# Patient Record
Sex: FEMALE | Race: WHITE | NOT HISPANIC OR LATINO | URBAN - METROPOLITAN AREA
[De-identification: names, ages, dates, MRNs, and addresses within clinical notes are randomized per-mention and may not be internally consistent; named-entity substitution may affect disease eponyms.]

---

## 2018-07-08 ENCOUNTER — EMERGENCY (EMERGENCY)
Facility: HOSPITAL | Age: 43
LOS: 1 days | Discharge: LEFT BEFORE DISCHARGE | End: 2018-07-08
Attending: EMERGENCY MEDICINE | Admitting: SURGERY
Payer: COMMERCIAL

## 2018-07-08 ENCOUNTER — INPATIENT (INPATIENT)
Facility: HOSPITAL | Age: 43
LOS: 0 days | Discharge: HOME | End: 2018-07-09
Attending: SPECIALIST | Admitting: SPECIALIST
Payer: COMMERCIAL

## 2018-07-08 ENCOUNTER — TRANSCRIPTION ENCOUNTER (OUTPATIENT)
Age: 43
End: 2018-07-08

## 2018-07-08 VITALS
DIASTOLIC BLOOD PRESSURE: 63 MMHG | SYSTOLIC BLOOD PRESSURE: 104 MMHG | TEMPERATURE: 98 F | OXYGEN SATURATION: 98 % | RESPIRATION RATE: 16 BRPM | HEART RATE: 78 BPM

## 2018-07-08 VITALS
OXYGEN SATURATION: 98 % | SYSTOLIC BLOOD PRESSURE: 114 MMHG | DIASTOLIC BLOOD PRESSURE: 75 MMHG | TEMPERATURE: 98 F | RESPIRATION RATE: 18 BRPM | HEART RATE: 97 BPM

## 2018-07-08 VITALS
TEMPERATURE: 98 F | DIASTOLIC BLOOD PRESSURE: 83 MMHG | HEART RATE: 88 BPM | OXYGEN SATURATION: 100 % | SYSTOLIC BLOOD PRESSURE: 118 MMHG | RESPIRATION RATE: 16 BRPM

## 2018-07-08 VITALS
SYSTOLIC BLOOD PRESSURE: 120 MMHG | TEMPERATURE: 98 F | OXYGEN SATURATION: 100 % | RESPIRATION RATE: 22 BRPM | HEART RATE: 77 BPM | DIASTOLIC BLOOD PRESSURE: 66 MMHG

## 2018-07-08 DIAGNOSIS — Z90.49 ACQUIRED ABSENCE OF OTHER SPECIFIED PARTS OF DIGESTIVE TRACT: Chronic | ICD-10-CM

## 2018-07-08 DIAGNOSIS — Z98.891 HISTORY OF UTERINE SCAR FROM PREVIOUS SURGERY: Chronic | ICD-10-CM

## 2018-07-08 DIAGNOSIS — T18.5XXA FOREIGN BODY IN ANUS AND RECTUM, INITIAL ENCOUNTER: ICD-10-CM

## 2018-07-08 LAB
ALBUMIN SERPL ELPH-MCNC: 4.4 G/DL — SIGNIFICANT CHANGE UP (ref 3.3–5)
ALP SERPL-CCNC: 64 U/L — SIGNIFICANT CHANGE UP (ref 40–120)
ALT FLD-CCNC: 18 U/L — SIGNIFICANT CHANGE UP (ref 4–33)
APTT BLD: 30.2 SEC — SIGNIFICANT CHANGE UP (ref 27.5–37.4)
AST SERPL-CCNC: 19 U/L — SIGNIFICANT CHANGE UP (ref 4–32)
BASOPHILS # BLD AUTO: 0.05 K/UL — SIGNIFICANT CHANGE UP (ref 0–0.2)
BASOPHILS NFR BLD AUTO: 0.5 % — SIGNIFICANT CHANGE UP (ref 0–2)
BILIRUB SERPL-MCNC: 0.2 MG/DL — SIGNIFICANT CHANGE UP (ref 0.2–1.2)
BLD GP AB SCN SERPL QL: NEGATIVE — SIGNIFICANT CHANGE UP
BUN SERPL-MCNC: 16 MG/DL — SIGNIFICANT CHANGE UP (ref 7–23)
CALCIUM SERPL-MCNC: 9.1 MG/DL — SIGNIFICANT CHANGE UP (ref 8.4–10.5)
CHLORIDE SERPL-SCNC: 102 MMOL/L — SIGNIFICANT CHANGE UP (ref 98–107)
CO2 SERPL-SCNC: 23 MMOL/L — SIGNIFICANT CHANGE UP (ref 22–31)
CREAT SERPL-MCNC: 0.9 MG/DL — SIGNIFICANT CHANGE UP (ref 0.5–1.3)
EOSINOPHIL # BLD AUTO: 0.2 K/UL — SIGNIFICANT CHANGE UP (ref 0–0.5)
EOSINOPHIL NFR BLD AUTO: 2 % — SIGNIFICANT CHANGE UP (ref 0–6)
GLUCOSE SERPL-MCNC: 100 MG/DL — HIGH (ref 70–99)
HCG SERPL-ACNC: < 5 MIU/ML — SIGNIFICANT CHANGE UP
HCT VFR BLD CALC: 44.6 % — SIGNIFICANT CHANGE UP (ref 34.5–45)
HGB BLD-MCNC: 14.3 G/DL — SIGNIFICANT CHANGE UP (ref 11.5–15.5)
IMM GRANULOCYTES # BLD AUTO: 0.08 # — SIGNIFICANT CHANGE UP
IMM GRANULOCYTES NFR BLD AUTO: 0.8 % — SIGNIFICANT CHANGE UP (ref 0–1.5)
INR BLD: 1.1 — SIGNIFICANT CHANGE UP (ref 0.88–1.17)
LYMPHOCYTES # BLD AUTO: 2.78 K/UL — SIGNIFICANT CHANGE UP (ref 1–3.3)
LYMPHOCYTES # BLD AUTO: 27.9 % — SIGNIFICANT CHANGE UP (ref 13–44)
MCHC RBC-ENTMCNC: 29.3 PG — SIGNIFICANT CHANGE UP (ref 27–34)
MCHC RBC-ENTMCNC: 32.1 % — SIGNIFICANT CHANGE UP (ref 32–36)
MCV RBC AUTO: 91.4 FL — SIGNIFICANT CHANGE UP (ref 80–100)
MONOCYTES # BLD AUTO: 0.61 K/UL — SIGNIFICANT CHANGE UP (ref 0–0.9)
MONOCYTES NFR BLD AUTO: 6.1 % — SIGNIFICANT CHANGE UP (ref 2–14)
NEUTROPHILS # BLD AUTO: 6.24 K/UL — SIGNIFICANT CHANGE UP (ref 1.8–7.4)
NEUTROPHILS NFR BLD AUTO: 62.7 % — SIGNIFICANT CHANGE UP (ref 43–77)
NRBC # FLD: 0 — SIGNIFICANT CHANGE UP
PLATELET # BLD AUTO: 240 K/UL — SIGNIFICANT CHANGE UP (ref 150–400)
PMV BLD: 10.8 FL — SIGNIFICANT CHANGE UP (ref 7–13)
POTASSIUM SERPL-MCNC: 3.8 MMOL/L — SIGNIFICANT CHANGE UP (ref 3.5–5.3)
POTASSIUM SERPL-SCNC: 3.8 MMOL/L — SIGNIFICANT CHANGE UP (ref 3.5–5.3)
PROT SERPL-MCNC: 7.4 G/DL — SIGNIFICANT CHANGE UP (ref 6–8.3)
PROTHROM AB SERPL-ACNC: 12.2 SEC — SIGNIFICANT CHANGE UP (ref 9.8–13.1)
RBC # BLD: 4.88 M/UL — SIGNIFICANT CHANGE UP (ref 3.8–5.2)
RBC # FLD: 12.8 % — SIGNIFICANT CHANGE UP (ref 10.3–14.5)
RH IG SCN BLD-IMP: NEGATIVE — SIGNIFICANT CHANGE UP
RH IG SCN BLD-IMP: NEGATIVE — SIGNIFICANT CHANGE UP
SODIUM SERPL-SCNC: 137 MMOL/L — SIGNIFICANT CHANGE UP (ref 135–145)
WBC # BLD: 9.96 K/UL — SIGNIFICANT CHANGE UP (ref 3.8–10.5)
WBC # FLD AUTO: 9.96 K/UL — SIGNIFICANT CHANGE UP (ref 3.8–10.5)

## 2018-07-08 PROCEDURE — 99285 EMERGENCY DEPT VISIT HI MDM: CPT | Mod: 25

## 2018-07-08 PROCEDURE — 99222 1ST HOSP IP/OBS MODERATE 55: CPT | Mod: 25,GC

## 2018-07-08 PROCEDURE — 45330 DIAGNOSTIC SIGMOIDOSCOPY: CPT | Mod: GC

## 2018-07-08 PROCEDURE — 74018 RADEX ABDOMEN 1 VIEW: CPT | Mod: 26,77

## 2018-07-08 PROCEDURE — 74177 CT ABD & PELVIS W/CONTRAST: CPT | Mod: 26

## 2018-07-08 PROCEDURE — 74018 RADEX ABDOMEN 1 VIEW: CPT | Mod: 26

## 2018-07-08 RX ORDER — SODIUM CHLORIDE 9 MG/ML
1000 INJECTION, SOLUTION INTRAVENOUS ONCE
Qty: 0 | Refills: 0 | Status: COMPLETED | OUTPATIENT
Start: 2018-07-08 | End: 2018-07-08

## 2018-07-08 RX ORDER — SODIUM CHLORIDE 9 MG/ML
1000 INJECTION, SOLUTION INTRAVENOUS
Qty: 0 | Refills: 0 | Status: DISCONTINUED | OUTPATIENT
Start: 2018-07-08 | End: 2018-07-09

## 2018-07-08 RX ORDER — SODIUM CHLORIDE 9 MG/ML
1000 INJECTION, SOLUTION INTRAVENOUS
Qty: 0 | Refills: 0 | Status: DISCONTINUED | OUTPATIENT
Start: 2018-07-08 | End: 2018-07-08

## 2018-07-08 RX ORDER — HEPARIN SODIUM 5000 [USP'U]/ML
5000 INJECTION INTRAVENOUS; SUBCUTANEOUS EVERY 8 HOURS
Qty: 0 | Refills: 0 | Status: DISCONTINUED | OUTPATIENT
Start: 2018-07-08 | End: 2018-07-08

## 2018-07-08 RX ADMIN — SODIUM CHLORIDE 100 MILLILITER(S): 9 INJECTION, SOLUTION INTRAVENOUS at 16:30

## 2018-07-08 RX ADMIN — Medication 1 MILLIGRAM(S): at 11:40

## 2018-07-08 RX ADMIN — SODIUM CHLORIDE 125 MILLILITER(S): 9 INJECTION, SOLUTION INTRAVENOUS at 06:03

## 2018-07-08 RX ADMIN — SODIUM CHLORIDE 2000 MILLILITER(S): 9 INJECTION, SOLUTION INTRAVENOUS at 06:03

## 2018-07-08 RX ADMIN — Medication 1 MILLIGRAM(S): at 04:08

## 2018-07-08 RX ADMIN — HEPARIN SODIUM 5000 UNIT(S): 5000 INJECTION INTRAVENOUS; SUBCUTANEOUS at 06:03

## 2018-07-08 NOTE — CONSULT NOTE ADULT - SUBJECTIVE AND OBJECTIVE BOX
GI INITIAL CONSULT    HPI: 42F presents after insertion of a foreign body into her rectum. Pt states that last night a rectal plug was inserted into her rectum and it went in too far and was lost in the rectal cavity. Pt and her partner came to the ER for further evaluation. Pt does not report any abd pain, N/V, diarrhea, or bleeding.    PMH: none reported  PSH: ; cholecystectomy    Meds: MEDICATIONS  (STANDING):  heparin  Injectable 5000 Unit(s) SubCutaneous every 8 hours  lactated ringers. 1000 milliLiter(s) (125 mL/Hr) IV Continuous <Continuous>    SH: noncontributory  FH: noncontributory  ROS:  CONSTITUTIONAL: No fever, weight loss, or fatigue  EYES: No eye pain, visual disturbances, or discharge  ENT:  No difficulty hearing, tinnitus, vertigo; No sinus or throat pain  NECK: No pain or stiffness  RESPIRATORY: No cough, wheezing, chills or hemoptysis, shortness of Breath  CARDIOVASCULAR: No chest pain, palpitations, passing out, dizziness, or leg swelling  GASTROINTESTINAL: see HPI  GENITOURINARY: No dysuria, frequency, hematuria, or incontinence  NEUROLOGICAL: No headaches, memory loss, loss of strength, numbness, or tremors  SKIN: No itching, burning, rashes, or lesions   MUSCULOSKELETAL: No arthralgia, myalgia, or back pain.     Vitals: T(C): 36.7 (18 @ 12:00)  T(F): 98 (18 @ 12:00), Max: 98.8 (18 @ 03:41)  HR: 85 (18 @ 12:00) (77 - 97)  BP: 115/73 (18 @ 12:00) (114/75 - 121/67)    Gen: NAD  CVS: RRR; S1/S2  Chest: CTABL  Abd: S/NT/ND                        14.3   9.96  )-----------( 240      ( 2018 04:37 )             44.6       137  |  102  |  16  ----------------------------<  100<H>  3.8   |  23  |  0.90    Ca    9.1      2018 04:37    TPro  7.4  /  Alb  4.4  /  TBili  0.2  /  DBili  x   /  AST  19  /  ALT  18  /  AlkPhos  64  07-08    Imaging reviewed

## 2018-07-08 NOTE — ED ADULT NURSE REASSESSMENT NOTE - NS ED NURSE REASSESS COMMENT FT1
RN Break Coverage : Alert and oriented x 4. Pt received from TREY Olivarez in no distress. Pt awaiting surgery consult. Will continue to monitor.

## 2018-07-08 NOTE — ED ADULT NURSE NOTE - OBJECTIVE STATEMENT
42yof a&ox4 amb presents to ED 12 w/ rectal foreign body. pt reports she was having intercourse with her boyfriend and used an "anal device" which she was unable to remove. pt c/o mild discomfort, took 2 tylenol at 0200 this AM. denies any bleeding. breathing even/unlabored. vss. will continue to monitor.

## 2018-07-08 NOTE — DISCHARGE NOTE ADULT - HOSPITAL COURSE
Ms Snowden presented to the ED due to a rectal foreign body. Before intervention was initiated, she left the ED AMA. When the patient came back to the ED, a flexible sigmoidoscopy was performed by the gastroenterologist. No foreign bodies were discovered at this time, however a sigmoid polyp was noted. Due to the lack of foreign body seen during the sigmoidoscopy, a CT scan of the abdomen and pelvis was performed to confirm this finding. The CT scan did not identify any foreign bodies in the colon or rectum. It is likely that the foreign body passed on its own while the patient was outside of the hospital. She was informed that she is to follow up with gastroenterology so that she can undergo a colonoscopy with polypectomy.

## 2018-07-08 NOTE — DISCHARGE NOTE ADULT - PATIENT PORTAL LINK FT
You can access the [facility], offered by [facility], by registering with the following website: [email or web address]

## 2018-07-08 NOTE — ED PROVIDER NOTE - OBJECTIVE STATEMENT
43 y/o F no PMHx, seen earlier tonight for a retained rectal plug, admitted to surgery, AMA'd due to fear of potential laparotomy and colostomy here for removal of the foreign body. Pt absolutely refuses any surgical intervention. Spoke to a private gastroenterologist, Dr. Sagar Tejada, who states he would come into the hospital later today and remove the object under anesthesia, via colonoscopy. Denies any complaints now. Appears anxious.

## 2018-07-08 NOTE — ED PROVIDER NOTE - PROGRESS NOTE DETAILS
KESHIA Hyde: Had an extensive conversation w/ the pt regarding the nessesity of removal of the foreign body. Pt states she understands the risks of not removing it (including infection, perforation, death) however will not consent to a laparotomy/colostomy. Pt refuses care from Dr. Vallejo from now on. Spoke to GI fellow, states Dr. Tejada booked the OR for colonoscopy under anesthesia today at 4. Spoke to surgery and gave Dr. Knight number to discuss plan. KESHIA Hyde: Spoke to surgery who discussed plan w/ GI, GI to take pt to the OR today to attempt removal of fb. Dr. Mcneil to speak to the pt soon regarding possible surgical intervention if GI is unsuccessful. ART Balderrama: Spoke with surgery team. They plan to have Ewa discuss surgical plan with pt in person. Litvak will be down to see pt shortly. KESHIA Hyde: Pt seen by Dr. Mcneil, will admit to his service. Will be in the OR with GI in case the fb in not removed w/ colonoscopy.

## 2018-07-08 NOTE — H&P ADULT - HISTORY OF PRESENT ILLNESS
43yo F with PMH of cholecystectomy and  presents today with rectal foreign body. Pt reports she had a rectal plug in her bottom that was placed about 2.5 hours ago. It went in too far and her and her partner were unable to retrieve it so came to the ED. Has some mild lower abdominal pain, no nausea, vomiting, fever, chills, diarrhea. Is passing gas but no BM.

## 2018-07-08 NOTE — CONSULT NOTE ADULT - ASSESSMENT
Impression:  1) Rectal foreign body without evidence of perforation or obstruction     Plan:  - OR for flex sig and foreign body removal  - surgical team on standby should foreign body not be amenable to endoscopic removal  - NPO

## 2018-07-08 NOTE — DISCHARGE NOTE ADULT - CARE PROVIDER_API CALL
Ti Frances), Gastroenterology; Internal Medicine  [address] [place] [id number]  Phone: [phone]  Fax: [phone]

## 2018-07-08 NOTE — H&P ADULT - ASSESSMENT
43 yo F with rectal foreign body    - NPO, IVF  - OR for EUA, rigid sig, possible exlap    discussed with Dr. Yoni VANG Team [id number]

## 2018-07-08 NOTE — H&P ADULT - NSHPLABSRESULTS_GEN_ALL_CORE
14.3   9.96  )-----------( 240      ( 08 Jul 2018 04:37 )             44.6           PT/INR - ( 08 Jul 2018 04:37 )   PT: 12.2 SEC;   INR: 1.10          PTT - ( 08 Jul 2018 04:37 )  PTT:30.2 SEC      IMAGING STUDIES:

## 2018-07-08 NOTE — H&P ADULT - NSHPPHYSICALEXAM_GEN_ALL_CORE
Vital Signs Last 24 Hrs  T(C): 37.1 (08 Jul 2018 03:41), Max: 37.1 (08 Jul 2018 03:41)  T(F): 98.8 (08 Jul 2018 03:41), Max: 98.8 (08 Jul 2018 03:41)  HR: 87 (08 Jul 2018 03:41) (87 - 97)  BP: 118/78 (08 Jul 2018 03:41) (114/75 - 118/78)  BP(mean): --  RR: 16 (08 Jul 2018 03:41) (16 - 18)  SpO2: 100% (08 Jul 2018 03:41) (98% - 100%)  Daily     Daily     Exam:  General: awake, alert, NAD  HEENT: NCAT, MMM  Resp: nonlabored  Chest: equal chest rise  Abd: soft, NT, ND, no rebound or guarding  Ext: ROSALES  Neuro: intact  Rectal: Foreign body palpated at tip of fingers but unable to retrieve. no blood. good rectal tone

## 2018-07-08 NOTE — DISCHARGE NOTE ADULT - CARE PLAN
Principal Discharge DX:	Foreign body of rectum  Goal:	no foreign body in rectum  Assessment and plan of treatment:	No further follow-up is needed for the foreign body. CT scan showed no evidence of foreign body in the rectum.  Secondary Diagnosis:	Polyp of sigmoid colon, unspecified type  Goal:	polypectomy  Assessment and plan of treatment:	Gastroenterology performed a flexible sigmoidoscopy which showed a polyp in the sigmoid colon. You are to follow-up with the gastroenterologist to schedule an elective colonoscopy in order for a polypectomy to be performed.

## 2018-07-08 NOTE — CONSULT NOTE ADULT - ASSESSMENT
42F w/a rectal foreign body.  -keep pt NPO  -pt will need endoscopic retrieval of the foreign body  -surgery team is on board for surgical retrieval if endoscopic retrieval is unsuccessful

## 2018-07-08 NOTE — H&P ADULT - HISTORY OF PRESENT ILLNESS
43yo F with PMH of cholecystectomy and  presents today with rectal foreign body. Pt reports she had a rectal plug in her bottom that was placed about 2.5 hours ago. It went in too far and her and her partner were unable to retrieve it so came to the ED. Has some mild lower abdominal pain, no nausea, vomiting, fever, chills, diarrhea. Is passing gas but no BM.   Patient was scheduled for OR for possible transanal extraction and, if unsuccessful, possible surgical intervention for extraction.  She left AMA after initially refusing surgical intervention but has now returned and is agreeable.  Gastroenterology has also been consulted and will assist in OR.

## 2018-07-08 NOTE — DISCHARGE NOTE ADULT - PLAN OF CARE
no foreign body in rectum No further follow-up is needed for the foreign body. CT scan showed no evidence of foreign body in the rectum. polypectomy Gastroenterology performed a flexible sigmoidoscopy which showed a polyp in the sigmoid colon. You are to follow-up with the gastroenterologist to schedule an elective colonoscopy in order for a polypectomy to be performed.

## 2018-07-08 NOTE — ED ADULT TRIAGE NOTE - CHIEF COMPLAINT QUOTE
Patient reports she was having intercourse with her boyfriend and used an "anal device". Patient reports object is currently stuck in her anus. Patient reports last taking "2 tylenols" at 2am with mild relief. Patient denies any PMHx.

## 2018-07-08 NOTE — ED PROVIDER NOTE - OBJECTIVE STATEMENT
43 y/o F with no significant PMH here with rectal foreign body.  Pt states approx 2 hours prior to arrival pt was using an anal plug during intercourse, but unable to remove it afterwards.  No other trauma or injury.  Pt c/o rectal and abd pain.

## 2018-07-08 NOTE — ED PROVIDER NOTE - ATTENDING CONTRIBUTION TO CARE
DR. ZAVALETA, ATTENDING MD-  I performed a face to face bedside interview with patient regarding history of present illness, review of symptoms and past medical history. I completed an independent physical exam.  I have discussed patient's plan of care with PA.   Documentation as above in the note.    41 y/o female h/o c/s, ck here with known fb anus.  She had anal plug in rectum, unable to retrieve at 2am, came to [facility] ED at that time, went to PACU for EUA, but refused to sign consent for possible open lap, s/o AMA.  Returns now after discussing with her prvt GI who is willing to attempt retrieval.  Denies f/c, abd pain, n/v/d, rectal bld.  Afebrile, vs wnl, anxious, ctabil, s1s2 rrr no m/r/g, abd soft non ttp no r/g, no cva tenderness b/l, no leg swelling b/l, no rash.  Will place iv, give anxiolytic, no signs of peritonitis, discuss with GI and surg re: optimal retrieval plan of fb.

## 2018-07-08 NOTE — ED ADULT TRIAGE NOTE - CHIEF COMPLAINT QUOTE
Pt needs anal plug removed. Pt AMA'd from PACU prior to coming to ED. Never had surgical intervention.  Pt returns for pain management abd GI consult.

## 2018-07-08 NOTE — ED PROVIDER NOTE - ATTENDING CONTRIBUTION TO CARE
41 y/o healthy F with rectal foreign body.  Reports use of anal plug 2 hours pta, unable to remove.  Pt now with pain to rectum and abdomen.  No other trauma.  Well appearing, lying comfortably in stretcher, awake and alert, nontoxic.  VSS.  Lungs cta bl.  Cards nl S1/S2, RRR, no MRG.  Abd soft ntnd.  No palpable foreign body on MARCELLA.  Will get xr to visulize and r/o free air, surg consult for poss OR removal.

## 2018-07-08 NOTE — H&P ADULT - ASSESSMENT
42 year old woman with foreign body in rectum    - Admit to B Team Surgery  - Gastroenterology (Dr. Tejada) to attempt transanal extraction; if unsuccessful, will pursue surgical intervention  - Discussed with Dr. Mcneil, Dr. Tejada, patient, and ED     MAEGAN Lilly MD PGY4 [id number]

## 2018-07-08 NOTE — CONSULT NOTE ADULT - ATTENDING COMMENTS
History/PE as noted. Patient seen/examined. Requested to evaluate patient for rectal foreign body-"rectal plug". Feels rectal pressure. Denies fever, nausea, vomiting or abdominal pain. Of note, reports having bowel movement since foreign body insertion.    PE: Comfortable/NAD/nontoxic-appearing. Abdomen-soft, nontender, nondistended and without mass or hepatosplenomegaly. Rectal-soft formed brown stool. Foreign body not palpated.    Colonoscopy-see full report. Partial colonoscopy to splenic flexure. Scattered areas of formed brown stool but no detection of foreign body. Incidental finding of a millimeter sessile sigmoid polyp.    Impression:       -History rectal foreign body-rectal plug. Not detected at partial colonoscopy to splenic flexure. Limited exam-formed stool and rectal foreign body possibly embedded in fecal mass although may have spontaneously passed at time of bowel movement.         -8 mm sessile sigmoid polyp.    REC:  -CT scan abdomen. If rectal or colon foreign body detected further evaluation to be planned with follow up colonoscopy after prep.  -Elective colonoscopy for polypectomy advised. Discussed with family.

## 2018-07-08 NOTE — H&P ADULT - NSHPPHYSICALEXAM_GEN_ALL_CORE
Vital Signs Last 24 Hrs  T(C): 36.7 (08 Jul 2018 12:00), Max: 37.1 (08 Jul 2018 03:41)  T(F): 98 (08 Jul 2018 12:00), Max: 98.8 (08 Jul 2018 03:41)  HR: 85 (08 Jul 2018 12:00) (77 - 97)  BP: 115/73 (08 Jul 2018 12:00) (114/75 - 121/67)  BP(mean): --  RR: 16 (08 Jul 2018 12:00) (16 - 22)  SpO2: 100% (08 Jul 2018 12:00) (98% - 100%)    General: No acute distress, appears nontoxic  Neurologic: No focal deficits  Psychiatric: Appropriate affect  Cardiovascular: Regular rate and rhythm  Pulmonary: Unlabored breathing  Abdominal: Nontender, soft, nondistended  Extremities: No edema, moves all without limitations  Skin: Warm, no rashes  Rectal: good rectal tone, no blood; object located distal to fingertip, unable to grasp

## 2018-07-08 NOTE — ED PROVIDER NOTE - MEDICAL DECISION MAKING DETAILS
41 y/o female with retained anal plug in rectum, s/o ama this AM from PACU, returned now for intervention.  Discuss with surgery and gi regarding retrieval.

## 2018-07-08 NOTE — CONSULT NOTE ADULT - SUBJECTIVE AND OBJECTIVE BOX
GASTROENTEROLOGY INITIAL CONSULT NOTE    Chief Complaint:  Patient is a 42y old  Female who presents with a chief complaint of Rectal foreign body (2018 15:25)      HPI: 42y Female with no significant PMH who presented with rectal foreign body. Patient initially came to ED early this morning after having a rectal plug placed into her anus that then could not be retrieved. She came to the ED with plan for OR however patient left AMA. She subsequently returned to the ED with rectal discomfort. She denies abdominal pain but feels a discomfort and fullness in her rectum. She is moving her bowels, last earlier today. She denies history of GI issues.      Allergies:  penicillin (Anaphylaxis)      Hospital Medications:  lactated ringers. 1000 milliLiter(s) IV Continuous <Continuous>      PMHX/PSHX:  No pertinent past medical history  H/O:   History of cholecystectomy  No significant past surgical history      Family history:  No pertinent family history in first degree relatives      Social History:     ROS:     General:  No wt loss, fevers, chills, night sweats, fatigue,   Eyes:  Good vision, no reported pain  ENT:  No sore throat, pain, runny nose, dysphagia  CV:  No pain, palpitations, hypo/hypertension  Resp:  No dyspnea, cough, tachypnea, wheezing  GI:  see HPI  :  No pain, bleeding, incontinence, nocturia  Muscle:  No pain, weakness  Neuro:  No weakness, tingling, memory problems  Psych:  No fatigue, insomnia, mood problems, depression  Endocrine:  No polyuria, polydipsia, cold/heat intolerance  Heme:  No petechiae, ecchymosis, easy bruisability  Skin:  No rash, tattoos, scars, edema      PHYSICAL EXAM:   Vital Signs:  Vital Signs Last 24 Hrs  T(C): 36.1 (2018 17:40), Max: 37.1 (2018 03:41)  T(F): 97 (2018 17:40), Max: 98.8 (2018 03:41)  HR: 83 (2018 17:40) (77 - 97)  BP: 113/66 (2018 17:40) (113/66 - 121/67)  BP(mean): --  RR: 16 (2018 17:40) (16 - 22)  SpO2: 100% (2018 17:40) (98% - 100%)  Daily Height in cm: 162.56 (2018 12:00)    Daily     GENERAL:  no acute distress  HEENT:  -icterus   CHEST:  clear bilaterally, no wheezes or rales  HEART:  RRR, S1S2  ABDOMEN:  soft, non-tender, non-distended, rectal done by attending without palpable foreign body, brown stool   EXTEREMITIES:  no  edema  SKIN:  No rash/erythema/ecchymoses/petechiae/wounds/abscess/warm/dry  NEURO:  alert, oriented, conversant     LABS:                        14.3   9.96  )-----------( 240      ( 2018 04:37 )             44.6     07-    137  |  102  |  16  ----------------------------<  100<H>  3.8   |  23  |  0.90    Ca    9.1      2018 04:37    TPro  7.4  /  Alb  4.4  /  TBili  0.2  /  DBili  x   /  AST  19  /  ALT  18  /  AlkPhos  64  07-08    LIVER FUNCTIONS - ( 2018 04:37 )  Alb: 4.4 g/dL / Pro: 7.4 g/dL / ALK PHOS: 64 u/L / ALT: 18 u/L / AST: 19 u/L / GGT: x           PT/INR - ( 2018 04:37 )   PT: 12.2 SEC;   INR: 1.10          PTT - ( 2018 04:37 )  PTT:30.2 SEC      Imaging:

## 2018-07-08 NOTE — CHART NOTE - NSCHARTNOTEFT_GEN_A_CORE
SURGERY    Patient explained risks/benefits/alternatives to surgery and consented for extraction of foreign object, possible laparotomy/colotomy/colostomy. However, in preop holding patient refused surgery, no longer wishing to consent for the possibility of laparotomy/colotomy/colostomy if transrectal extraction was unsuccessful. Patient understands that in order to proceed with OR, this consent was necessary. A second opinion from Dr. Garcia (colorectal surgery) was obtained who agreed that the consent and proposed procedure were appropriate, and counselled the patient who continued to refuse surgery. The patient chooses to leave AMA after a long discussion and understands the risks associated with leaving against medical advice including injury to rectum and possible perforation. She has signed the paperwork for leaving Against Medical Advice.

## 2018-07-10 PROBLEM — Z00.00 ENCOUNTER FOR PREVENTIVE HEALTH EXAMINATION: Status: ACTIVE | Noted: 2018-07-10

## 2018-07-11 DIAGNOSIS — K63.5 POLYP OF COLON: ICD-10-CM

## 2018-08-23 ENCOUNTER — MESSAGE (OUTPATIENT)
Age: 43
End: 2018-08-23

## 2018-08-23 RX ORDER — POLYETHYLENE GLYCOL 3350, SODIUM SULFATE, SODIUM CHLORIDE, POTASSIUM CHLORIDE, ASCORBIC ACID, SODIUM ASCORBATE 7.5-2.691G
100 KIT ORAL
Qty: 1 | Refills: 0 | Status: ACTIVE | COMMUNITY
Start: 2018-08-23 | End: 1900-01-01

## 2018-09-19 ENCOUNTER — APPOINTMENT (OUTPATIENT)
Dept: GASTROENTEROLOGY | Facility: CLINIC | Age: 43
End: 2018-09-19

## 2018-12-17 ENCOUNTER — APPOINTMENT (OUTPATIENT)
Dept: GASTROENTEROLOGY | Facility: HOSPITAL | Age: 43
End: 2018-12-17

## 2021-04-18 NOTE — ED PROVIDER NOTE - SKIN, MLM
"Upon admission to ICU from OR CRNA stated gave fentanyl for \"ease of extubation\" [facility] currently 24 from 14 prior to OR  Dr Samaniego at bedside and is aware  " Skin normal color for race, warm, dry and intact. No evidence of rash.

## 2021-08-30 NOTE — H&P ADULT - NSHPLABSRESULTS_GEN_ALL_CORE
[place], [place], [address]    DEPARTMENT OF HOSPITALIST MEDICINE      PROGRESS  NOTE:    NOTE: Portions of this note have been copied forward, however, changed to reflect the most current clinical status of this patient. Patient:  Concetta Goldstein  YOB: 1958  Date of Service: 8/30/2021  MRN: [id number]   Acct: [de-identified]   Primary Care Physician: Nancy Nolasco  Advance Directive: Full Code  Admit Date: 8/27/2021       Hospital Day: 3      CHIEF COMPLAINT:  No chief complaint on file. SUBJECTIVE:  Patient is able to move his left arm and continue movements. Left arm pain is slowly improving. Still complains of numbness at times. [address]  COURSE:    8/30/2021  Patient is underwent aggressive surgical interventions by vascular surgery over the last couple of days for revascularization and thrombectomy involving arteries of his left arm. Vascular surgery also consulted hand surgery and they are trying to transfer patient to tertiary center for higher level of care. Patient has been accepted by vascular surgeon Dr. Ana Deluca at [facility], but they have no beds and he is on the waiting list.    8/29/2021  Patient was seen by vascular surgeon this morning who did an arterial duplex which showed radial and under occlusion at the level of proximal to mid forearm despite being therapeutic on anticoagulation. Patient was taken back to the OR and underwent aggressive vascular surgical intervention. I saw and evaluate the patient postop in PACU. He is being transferred to ICU on anticoagulation. 8/28/2021  Patient admitted last night and underwent emergent intervention by our vascular surgery team for removal of thrombus from the left arm arterial system. Postop is doing well. Still has some numbness in the left hand and arm. Patient seen and evaluated by cardiology and oncology. Patient undergoing hypercoagulable work-up.   He is still on argatroban IV infusion. 8/27/2021  OSH Referring ED Sign Out:  onset at about 18:15 of severe left hand pain and purple color. 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 His PMH is HTN  Sx Hx left neck Sx many years ago, else no surgeries  No Allergies  Lisinopril  Mirtazapine  Naprxoen  Omeprazole  HCTZ  Labs remarkable for WBC 30k  Had diarrhea onset today  COVID test negative  Hb 18.3 HCT 53    DDimer 638  INR 1.0  CMP had , Cr 1.5m, BUN 39 nd all else \"looked good\"  Cardiac enzymes negative  EKG \"SR\"  CXR \"unremarkable\"  Nonsmoker     HPI:  Mr. Regina Mccray is a pleasant  [place] gentleman of 63 years. He presented to the [facility] Emergency Department for sudden left hand pain with purple color. He was referred to us for an ischemic left upper extremity with threatened hand by Kiki Ruiz NP. He had the onset at about 18:15 of severe left hand pain and purple color. 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 He is reported to be a nonsmoker with a PMHx of only HTN diagnosed. He is on Lisinopril, HCTZ, Naproxen, Mirtazapine, and Omeprazole at home. He has no known allergies. His only Sx Hx is of left neck surgery \"years ago\".        REVIEW  OF  SYSTEMS:    Constitutional:  No fevers, chills, nausea, vomiting, + tiredness and fatigue   Lungs:   No hemoptysis, pleurisy, cough, SOB   Heart:  No chest pressure with exertion, palpitations,    Abdomen:   No new masses, no bright red blood per rectum   Extremities: No acute pain while ambulating, no new lesions, + left arm under postop bandage   Neurologic: No new motor or sensory changes, + numbness left arm and hand        PAST MEDICAL HISTORY:  Past Medical History:   Diagnosis Date    Black lung ([address]     GERD (gastroesophageal reflux disease), as per medication reconciliation     History of tobacco abuse     Hypertension     Other chronic back pain     back broken in [place] in mine in [address] [facility] PAST SURGICAL HISTORY:  Past Surgical History:   Procedure Laterality Date    NECK SURGERY Left     \"years ago\" a per report on [id number]    WOUND EXPLORATION Left 8/29/2021    ARCH AORTOGRAM,LEFT ANGIOGRAM, EXPLORATION OF BRACHIAL ARTERY WITH THROMBECTOMY OF RADIAL AND ULNAR ARTERIES, LEFT SUBCLAVIAN STENT performed by Jak Butt DO at [facility] Left 8/28/2021    EXPLORATION LEFT UPPER EXTREMITY WITH THROMBECTOMY OF BRACHIAL, ULNAR AND RADIAL ARTERIES performed by Jak Butt DO at [facility] OR        FAMILY HISTORY:  Family History   Problem Relation Age of Onset    Heart Disease Mother     Other Mother         tobacco    Heart Disease Father     Other Father         tobacco    Cancer Father     Diabetes Sister     Heart Disease Sister     Other Sister         smoker    Dementia Sister     COPD Sister         smoker    COPD Sister         smoker    Cancer Sister     COPD Sister         heavy smoker    Drug Abuse Brother     No Known Problems Daughter     Heart Disease Daughter         congenital from the Mother's side           OBJECTIVE:  /66   Pulse 100   Temp 97.3 °F (36.3 °C) (Temporal)   Resp 14   Ht 5' 8\" (1.727 m)   Wt 236 lb 12.8 oz (107.4 kg)   SpO2 95%   BMI 36.01 kg/m²   I/O this shift:   In: 78.8 [I.V.:78.8]  Out: 325 [Urine:325]    PHYSICAL  EXAMINATION:    JANETH:  Awake, alert, oriented x 3, patient appears tired and fatigued   Head/Eyes:  Normocephalic, atraumatic, EOMI and PERRLA bilaterally   Respiratory:   Bilateral fair air entry in both lung fields, mild B/L crackles, symmetric expansion of chest   Cardiovascular:  Regular rate and rhythm, S1+S2+0, no murmurs/rubs   Abdomen:   Soft, non-tender, bowel sounds +ve, no organomegaly   Extremities: Moves all, full range of motion, + left arm in bandage status post vascular surgery interventions   Neurologic: Awake, alert, oriented x 3, cranial nerves II-XII intact, no focal neurological deficits, sensory system intact   Psychiatric: Normal mood, non-suicidal       CURRENT MEDICATIONS:  Scheduled:   sodium polystyrene  30 g Oral Once    gabapentin  300 mg Oral TID    ipratropium-albuterol  1 ampule Inhalation Q4H WA    cefepime  2,000 mg IntraVENous Q12H    sodium chloride flush  5-40 mL IntraVENous 2 times per day    hydroCHLOROthiazide  25 mg Oral Daily    lisinopril  20 mg Oral Daily    mirtazapine  45 mg Oral Nightly    pantoprazole  40 mg Oral QAM AC        PRN:  fentanNYL, 50 mcg, Q2H PRN  diphenhydrAMINE, 25 mg, Q6H PRN  sodium chloride flush, 5-40 mL, PRN  sodium chloride, 25 mL, PRN  HYDROcodone-acetaminophen, 1 tablet, Q4H PRN  calcium carbonate, 500 mg, TID PRN  polyethylene glycol, 17 g, Daily PRN  melatonin, 5 mg, Nightly PRN  naloxone, 0.4 mg, PRN  ondansetron, 4 mg, Q8H PRN   Or  ondansetron, 4 mg, Q6H PRN        Infusions:   sodium chloride 15 mL/hr at 08/30/21 0757    sodium chloride      nitroGLYCERIN 30 mcg/min (08/30/21 0756)    argatroban infusion 250 mg in 250 mL 2.5 mcg/kg/min (08/30/21 0632)       Laboratory Data:  Recent Labs     08/29/21  0719 08/29/21  1726 08/30/21  0608   WBC 19.2* 22.6* 22.6*   HGB 13.6* 12.9* 11.0*   * 146 167     Recent Labs     08/28/21  0458 08/29/21  0719 08/30/21  0608   * 131* 130*   K 4.7 5.0 5.5*    98 99   CO2 20* 23 24   BUN 43* 48* 46*   CREATININE 1.4* 1.2 1.2   GLUCOSE 222* 267* 181*     Recent Labs     08/27/21 2125   AST 22   ALT 44*   BILITOT 0.7   ALKPHOS 86     Troponin T:   Recent Labs     08/27/21 2203   TROPONINI <0.01     Pro-BNP: No results for input(s): BNP in the last 72 hours. INR:   Recent Labs     08/27/21 2125   INR 0.99     UA:No results for input(s): NITRITE, COLORU, PHUR, LABCAST, WBCUA, RBCUA, MUCUS, TRICHOMONAS, YEAST, BACTERIA, CLARITYU, SPECGRAV, LEUKOCYTESUR, UROBILINOGEN, BILIRUBINUR, BLOODU, GLUCOSEU, AMORPHOUS in the last 72 hours.     Invalid input(s): KETONESU  A1C: No results for input(s): LABA1C in the last 72 hours. ABG:No results for input(s): PHART, MEN6RYP, PO2ART, ZOS6NWY, BEART, HGBAE, G3ZPGTQQ, CARBOXHGBART in the last 72 hours. Imaging:    XR CHEST PORTABLE    Result Date: 8/28/2021  1. No radiographic evidence of acute cardiopulmonary process. Signed by Dr Esteban Pea:    Principal Problem:    Arterial embolism and thrombosis of upper extremity ([facility] [address]  Active Problems:    Hypertension    Ischemia of left upper extremity    GERD (gastroesophageal reflux disease), as per medication reconciliation    Black lung ([facility] [address]    History of tobacco abuse  Resolved Problems:    * No resolved hospital problems. *          Patient status post extensive vascular surgery as above . ..... POD # 2        Patient status post extensive vascular surgery as above . .....  POD # 1  Continue with current medications  Continue with the IV argatroban drip as per vascular surgery  Continue with the IV nitroglycerin drip as well for vasodilation  Continue with IV hydration  Strict I's and O's  Monitor renal functions  Patient potassium level was borderline high at 5.5  Kayexalate 30 g p.o. x1 dose ordered today  Monitor labs and electrolytes closely  Vascular surgery, cardiology and oncology recommendations reviewed, appreciated and agreed with  Specialist on the case I recommended patient to be transferred to tertiary care center for higher level of care  Vascular surgery spoke with the Dr. Pancho Bob at [facility] with accepted the patient  Patient is awaiting opening of bed at [facility] to initiate transfer  Continue with work-up as per specialists  Patient is undergoing hypercoagulable work-up  No preliminary evidence of any myxoma or atherogenic source heart as per cardiology  Follow-up closely with vascular surgery for further treatment recommendations  Patient is having persistent leukocytosis  I ordered Maxipime 2 g IV twice daily for bacterial infection prophylaxis  vICU consult given for patient management in ICU      Repeat labs in a.m. Electrolyte replacement as per protocol. Patient will be monitored very closely on the floor. Further recommendations as per the hospital course. Discharge Plan: Transfer patient to tertiary care center at [facility] in [place], [place] under care of vascular surgeon Dr. Wilber Ellis once bed is available      Patient  is on DVT prophylaxis  Current medications reviewed  Lab work reviewed  Radiology/Chest x-ray films reviewed  Treatment recommendations from suspecialities reviewed, appreciated and agreed with  Discussed with the nurse and addressed all questions/concerns  Discussed with Patient and/or Family at the bedside in detail . .. they understand and agree with the management plan. I attest that I spend >35 minutes engaged in critical care of this patient. This includes review of EMR data, imaging, bedside evaluation, patient/family counseling and coordination of care with nursing, providers and consultants. Charanjit Ferguson MD  8/30/2021 4:28 PM      DISCLAIMER: This note was created with electronic voice recognition which does have occasional errors. If you have any questions regarding the content within the note please do not hesitate to contact me. .. Thanks. Labs  CBC (07-08 @ 04:37)                          14.3                     9.96    )--------------(  240        62.7  % Neuts, 27.9  % Lymphs, ANC: 6.24                            44.6      BMP (07-08 @ 04:37)       137     |  102     |  16    			Ca++ --      Ca 9.1          ---------------------------------( 100<H>		Mg --           3.8     |  23      |  0.90  			Ph --        LFTs (07-08 @ 04:37)      TPro 7.4 / Alb 4.4 / TBili 0.2 / DBili -- / AST 19 / ALT 18 / AlkPhos 64    Coags (07-08 @ 04:37)  aPTT 30.2 / INR 1.10 / PT 12.2

## 2022-11-22 ENCOUNTER — EMERGENCY (EMERGENCY)
Facility: HOSPITAL | Age: 47
LOS: 1 days | Discharge: HOME | End: 2022-11-22
Attending: EMERGENCY MEDICINE | Admitting: EMERGENCY MEDICINE
Payer: SELF-PAY

## 2022-11-22 VITALS
DIASTOLIC BLOOD PRESSURE: 84 MMHG | HEART RATE: 82 BPM | SYSTOLIC BLOOD PRESSURE: 121 MMHG | RESPIRATION RATE: 19 BRPM | TEMPERATURE: 99 F | WEIGHT: 154.98 LBS | HEIGHT: 64 IN | OXYGEN SATURATION: 100 %

## 2022-11-22 DIAGNOSIS — Z90.49 ACQUIRED ABSENCE OF OTHER SPECIFIED PARTS OF DIGESTIVE TRACT: Chronic | ICD-10-CM

## 2022-11-22 DIAGNOSIS — Z98.891 HISTORY OF UTERINE SCAR FROM PREVIOUS SURGERY: Chronic | ICD-10-CM

## 2022-11-22 LAB
BASOPHILS # BLD AUTO: 0.04 K/UL — SIGNIFICANT CHANGE UP (ref 0–0.2)
BASOPHILS NFR BLD AUTO: 0.2 % — SIGNIFICANT CHANGE UP (ref 0–2)
EOSINOPHIL # BLD AUTO: 0.04 K/UL — SIGNIFICANT CHANGE UP (ref 0–0.5)
EOSINOPHIL NFR BLD AUTO: 0.2 % — SIGNIFICANT CHANGE UP (ref 0–6)
HCT VFR BLD CALC: 41.4 % — SIGNIFICANT CHANGE UP (ref 34.5–45)
HGB BLD-MCNC: 13.5 G/DL — SIGNIFICANT CHANGE UP (ref 11.5–15.5)
IMM GRANULOCYTES NFR BLD AUTO: 0.5 % — SIGNIFICANT CHANGE UP (ref 0–0.9)
LYMPHOCYTES # BLD AUTO: 19.2 % — SIGNIFICANT CHANGE UP (ref 13–44)
LYMPHOCYTES # BLD AUTO: 3.53 K/UL — HIGH (ref 1–3.3)
MCHC RBC-ENTMCNC: 30.1 PG — SIGNIFICANT CHANGE UP (ref 27–34)
MCHC RBC-ENTMCNC: 32.6 GM/DL — SIGNIFICANT CHANGE UP (ref 32–36)
MCV RBC AUTO: 92.4 FL — SIGNIFICANT CHANGE UP (ref 80–100)
MONOCYTES # BLD AUTO: 1.1 K/UL — HIGH (ref 0–0.9)
MONOCYTES NFR BLD AUTO: 6 % — SIGNIFICANT CHANGE UP (ref 2–14)
NEUTROPHILS # BLD AUTO: 13.6 K/UL — HIGH (ref 1.8–7.4)
NEUTROPHILS NFR BLD AUTO: 73.9 % — SIGNIFICANT CHANGE UP (ref 43–77)
NRBC # BLD: 0 /100 WBCS — SIGNIFICANT CHANGE UP (ref 0–0)
PLATELET # BLD AUTO: 247 K/UL — SIGNIFICANT CHANGE UP (ref 150–400)
RBC # BLD: 4.48 M/UL — SIGNIFICANT CHANGE UP (ref 3.8–5.2)
RBC # FLD: 12.9 % — SIGNIFICANT CHANGE UP (ref 10.3–14.5)
WBC # BLD: 18.41 K/UL — HIGH (ref 3.8–10.5)
WBC # FLD AUTO: 18.41 K/UL — HIGH (ref 3.8–10.5)

## 2022-11-22 PROCEDURE — 99284 EMERGENCY DEPT VISIT MOD MDM: CPT

## 2022-11-22 RX ORDER — FAMOTIDINE 10 MG/ML
20 INJECTION INTRAVENOUS ONCE
Refills: 0 | Status: COMPLETED | OUTPATIENT
Start: 2022-11-22 | End: 2022-11-22

## 2022-11-22 RX ORDER — DEXAMETHASONE 0.5 MG/5ML
10 ELIXIR ORAL ONCE
Refills: 0 | Status: COMPLETED | OUTPATIENT
Start: 2022-11-22 | End: 2022-11-22

## 2022-11-22 RX ORDER — KETOROLAC TROMETHAMINE 30 MG/ML
30 SYRINGE (ML) INJECTION ONCE
Refills: 0 | Status: DISCONTINUED | OUTPATIENT
Start: 2022-11-22 | End: 2022-11-22

## 2022-11-22 RX ORDER — DEXAMETHASONE 0.5 MG/5ML
10 ELIXIR ORAL ONCE
Refills: 0 | Status: DISCONTINUED | OUTPATIENT
Start: 2022-11-22 | End: 2022-11-22

## 2022-11-22 RX ADMIN — Medication 100 MILLIGRAM(S): at 23:32

## 2022-11-22 RX ADMIN — Medication 102 MILLIGRAM(S): at 23:32

## 2022-11-22 RX ADMIN — Medication 30 MILLIGRAM(S): at 23:32

## 2022-11-22 RX ADMIN — FAMOTIDINE 20 MILLIGRAM(S): 10 INJECTION INTRAVENOUS at 23:32

## 2022-11-22 NOTE — ED PROVIDER NOTE - PROGRESS NOTE DETAILS
pt's feeling much better, able to speak, swallow, will d/c home after 2nd L NS.  Advised to f/u with ENT if no improvement

## 2022-11-22 NOTE — ED PROVIDER NOTE - OBJECTIVE STATEMENT
47 y.o. female pt with IUD, c/o sore throat since Sun, chills, roxanna earache, pt with drooling, no d/c, coughing.  Pt's given Zpak & Abx eardrops with no relief.  Took Tylenol @ 4pm with no relief.

## 2022-11-22 NOTE — ED ADULT NURSE NOTE - OBJECTIVE STATEMENT
patient reports  severe left ear pain /sore throat /unable to swallow her saliva x 3 days. pain scale 6/10

## 2022-11-22 NOTE — ED PROVIDER NOTE - ENMT, MLM
Airway patent, Nasal mucosa clear. Mouth with Moderate dry mucosa. Throat has no vesicles, roxanna tonsils-edematous, exudate noted Rt tonsil

## 2022-11-22 NOTE — ED PROVIDER NOTE - PATIENT PORTAL LINK FT
You can access the FollowMyHealth Patient Portal offered by [facility] by registering at the following website: [email or web address]. By joining [employer]’s FollowMyHealth portal, you will also be able to view your health information using other applications (apps) compatible with our system.

## 2022-11-22 NOTE — ED PROVIDER NOTE - NSFOLLOWUPINSTRUCTIONS_ED_ALL_ED_FT
Pharyngitis    WHAT YOU NEED TO KNOW:    Pharyngitis, or sore throat, is inflammation of the tissues and structures in your pharynx (throat). Pharyngitis is most often caused by bacteria. It may also be caused by a cold or flu virus. Other causes include smoking, allergies, or acid reflux.     DISCHARGE INSTRUCTIONS:    Call 911 for any of the following:     You have trouble breathing or swallowing because your throat is swollen or sore.        Return to the emergency department if:     You are drooling because it hurts too much to swallow.      Your fever is higher than 102°F (39°C) or lasts longer than 3 days.      You are confused.      You taste blood in your throat.    Contact your healthcare provider if:     Your throat pain gets worse.      You have a painful lump in your throat that does not go away after 5 days.      Your symptoms do not improve after 5 days.      You have questions or concerns about your condition or care.    Medicines: Viral pharyngitis will go away on its own without treatment. Your sore throat should start to feel better in 3 to 5 days for both viral and bacterial infections. You may need any of the following:     Antibiotics treat a bacterial infection.      NSAIDs, such as ibuprofen, help decrease swelling, pain, and fever. NSAIDs can cause stomach bleeding or kidney problems in certain people. If you take blood thinner medicine, always ask your healthcare provider if NSAIDs are safe for you. Always read the medicine label and follow directions.      Acetaminophen decreases pain and fever. It is available without a doctor's order. Ask how much to take and how often to take it. Follow directions. Acetaminophen can cause liver damage if not taken correctly.      Take your medicine as directed. Contact your healthcare provider if you think your medicine is not helping or if you have side effects. Tell him or her if you are allergic to any medicine. Keep a list of the medicines, vitamins, and herbs you take. Include the amounts, and when and why you take them. Bring the list or the pill bottles to follow-up visits. Carry your medicine list with you in case of an emergency.    Manage your symptoms:     Gargle salt water. Mix ¼ teaspoon salt in an 8 ounce glass of warm water and gargle. This may help decrease swelling in your throat.      Drink liquids as directed. You may need to drink more liquids than usual. Liquids may help soothe your throat and prevent dehydration. Ask how much liquid to drink each day and which liquids are best for you.      Use a cool-steam humidifier to help moisten the air in your room and calm your cough.      Soothe your throat with cough drops, ice, soft foods, or popsicles.    Prevent the spread of pharyngitis: Cover your mouth and nose when you cough or sneeze. Do not share food or drinks. Wash your hands often. Use soap and water. If soap and water are unavailable, use an alcohol based hand .     Follow up with your healthcare provider as directed: Write down your questions so you remember to ask them during your visits.    Take Probiotics while taking antibiotics  Vitamin C 1000mg  follow up with ENT if no improvement

## 2022-11-22 NOTE — ED PROVIDER NOTE - CLINICAL SUMMARY MEDICAL DECISION MAKING FREE TEXT BOX
pt with acute pharyngitis, does not appear to have significant peritonsillar abscess, pt failed outpt treatment.  Will get labs., give meds. reassess

## 2022-11-23 VITALS
SYSTOLIC BLOOD PRESSURE: 116 MMHG | OXYGEN SATURATION: 97 % | TEMPERATURE: 98 F | HEART RATE: 84 BPM | RESPIRATION RATE: 19 BRPM | DIASTOLIC BLOOD PRESSURE: 74 MMHG

## 2022-11-23 LAB
ACETONE SERPL-MCNC: ABNORMAL
ALBUMIN SERPL ELPH-MCNC: 3.7 G/DL — SIGNIFICANT CHANGE UP (ref 3.5–5)
ALP SERPL-CCNC: 70 U/L — SIGNIFICANT CHANGE UP (ref 40–120)
ALT FLD-CCNC: 52 U/L DA — SIGNIFICANT CHANGE UP (ref 10–60)
ANION GAP SERPL CALC-SCNC: 6 MMOL/L — SIGNIFICANT CHANGE UP (ref 5–17)
AST SERPL-CCNC: 17 U/L — SIGNIFICANT CHANGE UP (ref 10–40)
BILIRUB SERPL-MCNC: 0.4 MG/DL — SIGNIFICANT CHANGE UP (ref 0.2–1.2)
BUN SERPL-MCNC: 11 MG/DL — SIGNIFICANT CHANGE UP (ref 7–18)
CALCIUM SERPL-MCNC: 9.3 MG/DL — SIGNIFICANT CHANGE UP (ref 8.4–10.5)
CHLORIDE SERPL-SCNC: 111 MMOL/L — HIGH (ref 96–108)
CO2 SERPL-SCNC: 24 MMOL/L — SIGNIFICANT CHANGE UP (ref 22–31)
CREAT SERPL-MCNC: 0.68 MG/DL — SIGNIFICANT CHANGE UP (ref 0.5–1.3)
EGFR: 107 ML/MIN/1.73M2 — SIGNIFICANT CHANGE UP
GLUCOSE SERPL-MCNC: 106 MG/DL — HIGH (ref 70–99)
HCG SERPL-ACNC: <1 MIU/ML — SIGNIFICANT CHANGE UP
LACTATE SERPL-SCNC: 0.8 MMOL/L — SIGNIFICANT CHANGE UP (ref 0.7–2)
MAGNESIUM SERPL-MCNC: 2.2 MG/DL — SIGNIFICANT CHANGE UP (ref 1.6–2.6)
POTASSIUM SERPL-MCNC: 4.3 MMOL/L — SIGNIFICANT CHANGE UP (ref 3.5–5.3)
POTASSIUM SERPL-SCNC: 4.3 MMOL/L — SIGNIFICANT CHANGE UP (ref 3.5–5.3)
PROT SERPL-MCNC: 7.7 G/DL — SIGNIFICANT CHANGE UP (ref 6–8.3)
SODIUM SERPL-SCNC: 141 MMOL/L — SIGNIFICANT CHANGE UP (ref 135–145)

## 2022-11-23 PROCEDURE — 80053 COMPREHEN METABOLIC PANEL: CPT

## 2022-11-23 PROCEDURE — 99284 EMERGENCY DEPT VISIT MOD MDM: CPT | Mod: 25

## 2022-11-23 PROCEDURE — 83735 ASSAY OF MAGNESIUM: CPT

## 2022-11-23 PROCEDURE — 83605 ASSAY OF LACTIC ACID: CPT

## 2022-11-23 PROCEDURE — 87040 BLOOD CULTURE FOR BACTERIA: CPT

## 2022-11-23 PROCEDURE — 96374 THER/PROPH/DIAG INJ IV PUSH: CPT

## 2022-11-23 PROCEDURE — 96375 TX/PRO/DX INJ NEW DRUG ADDON: CPT

## 2022-11-23 PROCEDURE — 36415 COLL VENOUS BLD VENIPUNCTURE: CPT

## 2022-11-23 PROCEDURE — 85025 COMPLETE CBC W/AUTO DIFF WBC: CPT

## 2022-11-23 PROCEDURE — 84702 CHORIONIC GONADOTROPIN TEST: CPT

## 2022-11-23 PROCEDURE — 82009 KETONE BODYS QUAL: CPT

## 2022-11-23 RX ORDER — IBUPROFEN 200 MG
1 TABLET ORAL
Qty: 21 | Refills: 0
Start: 2022-11-23 | End: 2022-11-29

## 2022-11-23 RX ORDER — SODIUM CHLORIDE 9 MG/ML
2000 INJECTION INTRAMUSCULAR; INTRAVENOUS; SUBCUTANEOUS ONCE
Refills: 0 | Status: COMPLETED | OUTPATIENT
Start: 2022-11-23 | End: 2022-11-23

## 2022-11-23 RX ADMIN — SODIUM CHLORIDE 2000 MILLILITER(S): 9 INJECTION INTRAMUSCULAR; INTRAVENOUS; SUBCUTANEOUS at 02:33

## 2022-11-23 RX ADMIN — Medication 300 MILLIGRAM(S): at 02:42

## 2022-11-23 RX ADMIN — Medication 30 MILLIGRAM(S): at 02:33

## 2022-11-28 LAB
CULTURE RESULTS: SIGNIFICANT CHANGE UP
CULTURE RESULTS: SIGNIFICANT CHANGE UP
SPECIMEN SOURCE: SIGNIFICANT CHANGE UP
SPECIMEN SOURCE: SIGNIFICANT CHANGE UP

## 2024-04-29 NOTE — ED ADULT NURSE NOTE - OBJECTIVE STATEMENT
Total Knee Replacement  Discharge Instructions    To prevent Clot formation, you have been placed on the following medication:  ASA 81 mg twice a day for 30 days started on 4/29/24.    Surgical Site Care:  Change dressing once a day and PRN (as needed). Apply 4 x 4 sponge and light tape. If glue present, leave open to air.  You may leave wound open to air after initial dressing removal, if wound is clean, dry and intact  If Aquacel Ag dressing is present, do not remove dressing for 7 days, unless heavily saturated. If heavily saturated, remove dressing and start using instructions above  Staples will be removed on post-operative day 14 and steri-strips applied  Showering is permitted starting POD1 if waterproof Aquacel dressing is present or when the incision is covered with 4 x 4 and Tegaderm waterproof dressing  Until all areas of incision are healed.    Physical Therapy:  Weight Bearing Status:  WBAT  Precautions, Per Physical Therapy Handout    Pain Medications  You were given  oxycodone  Wean off pain medications as you deem appropriate as long as pain is under control    Cold packs/Ice packs/Machine  May be used 5 times daily for 15-30 minutes as necessary  Be sure to have a barrier (cloth, clothing, towel) between the site and the ice pack to prevent frostbite    Contact [facility] office if  Increased redness, swelling, drainage of any kind, and/or pain to surgery site.  As well as new onset fevers and or chills.  These could signify an infection.  Calf or thigh tenderness to touch as well as increased swelling or redness.  This could signify a clot formation.  Numbness or tingling to an area around the incision site or below the incision site (toes).  Any rash appears, increased  or new onset nausea/vomiting occur.  This may indicate a reaction to a medication.  Phone # [phone].  Follow up with Surgeon in 2 weeks  I acknowledge that I have received logan hose and understand the instructions  on how and when to wear them (on during the day, off at night)   Discharging RN who has gone over instructions and acknowledges pamela hose have been received     Ice 5 times a day for 20 minutes each session to operative extremity for two weeks.   PAMELA hose to be worn for three weeks. Can be removed for skin care and hygiene.   Incentive spirometer 10 times every hour while awake for two weeks.    Received pt into spot #14. Pt A/O x 4 anxious, tearful. Pt AMA'd from preoperative care in OR this morning, requesting 2nd opinion to remove foreign object in anal canal. Valencia SCHMITT in to assess and talk to patient. Dr. Benavidez in to see pt. Peripheral line inserted upon MD request. Ativan 1mg IVP given as ordered. Significant other at bedside.